# Patient Record
Sex: MALE | Race: BLACK OR AFRICAN AMERICAN | Employment: FULL TIME | URBAN - METROPOLITAN AREA
[De-identification: names, ages, dates, MRNs, and addresses within clinical notes are randomized per-mention and may not be internally consistent; named-entity substitution may affect disease eponyms.]

---

## 2021-04-27 ENCOUNTER — OFFICE VISIT (OUTPATIENT)
Dept: URGENT CARE | Facility: URGENT CARE | Age: 56
End: 2021-04-27
Payer: COMMERCIAL

## 2021-04-27 VITALS
HEART RATE: 82 BPM | SYSTOLIC BLOOD PRESSURE: 152 MMHG | OXYGEN SATURATION: 100 % | RESPIRATION RATE: 18 BRPM | WEIGHT: 159.8 LBS | TEMPERATURE: 98.7 F | DIASTOLIC BLOOD PRESSURE: 81 MMHG

## 2021-04-27 DIAGNOSIS — M79.89 SWELLING OF RIGHT THUMB: ICD-10-CM

## 2021-04-27 DIAGNOSIS — M79.644 PAIN OF RIGHT THUMB: Primary | ICD-10-CM

## 2021-04-27 DIAGNOSIS — Z87.39 HISTORY OF GOUT: ICD-10-CM

## 2021-04-27 PROCEDURE — 99204 OFFICE O/P NEW MOD 45 MIN: CPT | Performed by: PHYSICIAN ASSISTANT

## 2021-04-27 ASSESSMENT — PAIN SCALES - GENERAL: PAINLEVEL: EXTREME PAIN (9)

## 2021-04-27 NOTE — PATIENT INSTRUCTIONS
Extremely swollen, painful, warm r thumb x 48 hours. No injury.  Limited on labs and imaging. ? Cellulitis, abscess, Gout, rheumatoid.   to ER for evaluation

## 2021-04-27 NOTE — PROGRESS NOTES
Chief Complaint   Patient presents with     Thumb Discomfort     Right thumb pain/swelling x2 days. No known injury             Medical Decision Making:    Differential Diagnosis:  MS Injury Pain: sprain, tendonitis, gout, septic arthritis, rheumatoid arthritis, cellulitis, abscess          ASSESSMENT:    ICD-10-CM    1. Pain of right thumb  M79.644    2. Swelling of right thumb  M79.89    3. History of gout  Z87.39              PLAN: To ER for evaluation of acute onset of a severe right thumb, pain, swelling, warmth over the last 48 hours.  This could be gout but I almost feel a palpable abscess in the pad of the thumb.  We are limited on labs and imaging here.   Consider cellulitis, abscess, septic arthritis, gout, tendinitis.  Advised about symptoms which might herald more serious problems.            Nicole Anne PA-C      SUBJECTIVE:   Travis Leo is an 55 year old male who presents with acute onset of right thumb swelling, pain, warmth for 48 hours.  No injury.  He denies any history of gout but after chart review I see he was seen in the emergency department in December 2010 for gout of his foot.    No history of other joint pains.  His work is very repetitive with his hands.  No fever.  Pain has been extreme over the last 10 hours.  No numbness or tingling.  Unable to bend it at all secondary to the pain.      No Known Allergies    History reviewed. No pertinent past medical history.    Past medical history-gout in his foot 2010.    Social History     Tobacco Use     Smoking status: Never Smoker     Smokeless tobacco: Never Used   Substance Use Topics     Alcohol use: None     Drug use: None       ROS:  Gen: no fevers  Musculoskel: + as above  Skin: as above    OBJECTIVE:  BP (!) 152/81 (BP Location: Left arm, Patient Position: Sitting, Cuff Size: Adult Large)   Pulse 82   Temp 98.7  F (37.1  C) (Tympanic)   Resp 18   Wt 72.5 kg (159 lb 12.8 oz)   SpO2 100%    General:   awake, alert, and  cooperative.  NAD.   Head: Normocephalic, atraumatic.  Eyes: Conjunctiva clear,   MS: Right entire thumb is with severe swelling, warmth, tenderness.  I believe there is some redness of the skin present.  Unable to flex the thumb at all secondary to the pain.  Even has significant swelling into the thenar eminence.  Pad of thumb is extremely tender with possible palpable abscess.  Neuro: Alert and oriented - normal speech.  Sensation to soft touch intact.  Good palpable radial pulse.    Nicole Anne PA-C